# Patient Record
Sex: MALE | Race: WHITE
[De-identification: names, ages, dates, MRNs, and addresses within clinical notes are randomized per-mention and may not be internally consistent; named-entity substitution may affect disease eponyms.]

---

## 2017-08-01 ENCOUNTER — HOSPITAL ENCOUNTER (EMERGENCY)
Dept: HOSPITAL 80 - FED | Age: 32
Discharge: LEFT BEFORE DISCHARGE | End: 2017-08-01
Payer: MEDICAID

## 2017-08-01 DIAGNOSIS — Z53.21: Primary | ICD-10-CM

## 2017-08-02 ENCOUNTER — HOSPITAL ENCOUNTER (EMERGENCY)
Dept: HOSPITAL 80 - FED | Age: 32
Discharge: HOME | End: 2017-08-02
Payer: MEDICAID

## 2017-08-02 VITALS
RESPIRATION RATE: 18 BRPM | TEMPERATURE: 97.9 F | OXYGEN SATURATION: 98 % | HEART RATE: 86 BPM | DIASTOLIC BLOOD PRESSURE: 89 MMHG | SYSTOLIC BLOOD PRESSURE: 125 MMHG

## 2017-08-02 DIAGNOSIS — F31.9: Primary | ICD-10-CM

## 2017-08-02 DIAGNOSIS — F17.200: ICD-10-CM

## 2017-08-02 NOTE — EDPHY
H & P


Stated Complaint: requests concerta for few days until able to see pcp


Source: Patient


Exam Limitations: No limitations





- Personal History


Current Tetanus/Diphtheria Vaccine: Unsure


Current Tetanus Diphtheria and Acellular Pertussis (TDAP): Unsure





- Medical/Surgical History


Hx Asthma: No


Hx Chronic Respiratory Disease: No


Hx Diabetes: No


Hx Cardiac Disease: No


Hx Renal Disease: No


Hx Cirrhosis: No


Hx Alcoholism: No


Hx HIV/AIDS: No


Hx Splenectomy or Spleen Trauma: No


Other PMH: psh: denies.  PMH: bipolar;





- Social History


Smoking Status: Current some day smoker


Time Seen by Provider: 08/02/17 04:57


HPI/ROS: 





HPI


The patient presents with request for refill of his Concerta.  He is currently 

being prescribed this by his treating psychiatrist, Dr. Francisco Farrell.  His 

psychiatrist is discontinuing the medication and the patient only has 7 pills 

left.  He states that his psychiatrist would like him to be off of this 

medication because he feels that it may be worsening the manic episodes of his 

bipolar disorder.  The patient feels that the medication is actually quite 

helpful and he is concerned that if he goes off of it he will not be 

functioning at the same level he is now.  He does have an appointment with his 

primary care doctor on August 10th and he is hopeful that his primary care 

doctor will prescribe this medication, however, he is feeling quite stressed 

out currently.  He is here requesting mental health evaluation.  He says he 

would like to come up with a plan for when he runs out of his Concerta.  He 

denies any SI or HI.





REVIEW OF SYSTEMS


Constitutional:  No fever, no chills.


Eyes:  No discharge.


ENT:  No sore throat.


Cardiovascular:  No chest pain, no palpitations.


Respiratory:  No cough, no shortness of breath.


Gastrointestinal:  No abdominal pain, no vomiting.


Genitourinary:  No hematuria.


Musculoskeletal:  No back pain.


Skin:  No rashes.


Neurological:  No headache.





PMHx:  Bipolar disorder verses attention deficit hyperactivity disorder








PHYSICAL


General Appearance: Alert, no distress


Eyes: Pupils equal and round no pallor or injection


ENT, Mouth: Mucous membranes moist


Respiratory:  Breathing comfortably


Neurological:  A&O, moves all extremities


Skin:  Warm and dry, no rashes


Musculoskeletal: Neck is supple non tender 


Extremities:  symmetrical, full range of motion 


Psychiatric:  Patient is oriented X 3, there is no agitation 


 (Riguzzi,Bella)


Constitutional: 


 Initial Vital Signs











Temperature (C)  36.6 C   08/02/17 04:31


 


Heart Rate  86   08/02/17 04:31


 


Respiratory Rate  18   08/02/17 04:31


 


Blood Pressure  125/89 H  08/02/17 04:31


 


O2 Sat (%)  98   08/02/17 04:31








 











O2 Delivery Mode               Room Air














Allergies/Adverse Reactions: 


 





No Known Allergies Allergy (Unverified 12/28/15 09:03)


 








Home Medications: 














 Medication  Instructions  Recorded


 


Concerta  12/28/15


 


Lithium Carbonate  12/28/15


 


Seroquel  08/02/17














Medical Decision Making


Differential Diagnosis: 





This is a 32-year-old male with bipolar disorder, possibly attention deficit 

hyperactivity disorder, currently followed by a psychiatrist whom is 

discontinuing his prescription for Concerta.  Patient has 7 tabs left and is 

concerned about what will happen when he runs out.  He believes he is being 

proactive by coming to the emergency room to come up with a plan and would like 

to see the mental health worker for assistance with this.





He has no SI or HI currently and does not meet criteria for mental health hold.





Differential diagnosis includes attention deficit hyperactivity disorder, 

bipolar disorder with hypomania prescription drug abuse.





The patient is insistent on seeing the mental health worker, I have requested 

that they visit him, though I do not think he needs a full evaluation as he 

does not meet criteria for a hold. (Bella Holloway)


Other Provider: 





715: Salo from [facility] discussed situation with the patient and recommended he go 

directly to the emergency psychiatric center for further evaluation, as he does 

not appear to meet criteria for 72 hour mental health hold at this time and 

does not appear to have an acute medical or surgical emergency.  Patient and 

his mother are agreeable. (Ricardo Scales)





Departure





- Departure


Disposition: Home, Routine, Self-Care


Clinical Impression: 


Bipolar disorder


Qualifiers:


 Active/Remission status: remission status unspecified Qualified Code(s): F31.9 

- Bipolar disorder, unspecified





Condition: Good


Instructions:  Bipolar Disorder (ED)


Additional Instructions: 


Go directly to the Emergency mental health center as discussed with you by 

Salo from [facility].


He has been medically cleared for further psychiatric evaluation by them.


Referrals: 


Meri Murphy MD [Primary Care Provider] - As per Instructions


[facility],. [Clinic] - As per Instructions

## 2018-05-16 ENCOUNTER — HOSPITAL ENCOUNTER (EMERGENCY)
Dept: HOSPITAL 80 - FED | Age: 33
LOS: 2 days | Discharge: PSYCHIATRIC OR SUBSTANCE USE FACILITY | End: 2018-05-18
Payer: MEDICAID

## 2018-05-16 DIAGNOSIS — R45.850: Primary | ICD-10-CM

## 2018-05-16 PROCEDURE — G0480 DRUG TEST DEF 1-7 CLASSES: HCPCS

## 2018-05-17 LAB — PLATELET # BLD: 200 10^3/UL (ref 150–400)

## 2018-05-17 NOTE — EDPHY
H & P


Stated Complaint: M-1 HOLD BIB [facility]





- Personal History


Current Tetanus/Diphtheria Vaccine: Yes


Current Tetanus Diphtheria and Acellular Pertussis (TDAP): Yes





- Medical/Surgical History


Hx Asthma: No


Hx Chronic Respiratory Disease: No


Hx Diabetes: No


Hx Cardiac Disease: No


Hx Renal Disease: No


Hx Cirrhosis: No


Hx Alcoholism: No


Hx HIV/AIDS: No


Hx Splenectomy or Spleen Trauma: No


Other PMH: psh: denies.  PMH: bipolar;





- Social History


Smoking Status: Current some day smoker


Time Seen by Provider: 05/16/18 23:35


HPI/ROS: 





Chief Complaint:  Homicidal ideation





HPI:  32-year-old male was brought in by [place] police with concerns about 

homicidal ideation.  Patient states that he has been diagnosed with bipolar 

disorder in the past but feels that this was a misdiagnosis.  Patient states 

that he is very upset with the course of treatment he has had in the past and 

was calling medical board today to report his primary care provider for the 

misdiagnosis.  During the course that conversation the patient had mentioned 

that he now understands why people who conduct mass killing sees often fall 

through the cracks because of missed diagnosis such as these.  The FBI went to 

his house yesterday to investigate.  Today the patient has been calling 

speaking with the crisis center multiple times for several hours.  During his 

course those conversations patient continues to add mention mass shooting so.  

Police department was called.  They did search his apartment but did not find 

any weapons.  Patient denies owning any weapons.  They were unable to search is 

high car.  Patient currently denies being homicidal.  States he does not want 

to conduct a mass killing but he is very concerned in agitated about his 

perceived missed diagnosis.  Patient will put on M1 hold by police and brought 

in for further evaluation.





ROS:  10 point Review of Systems is negative except as noted in the HPI.





PMH:  Attention deficit hyperactivity disorder, questionable bipolar disorder





Social History: No smoking, no alcohol,  no recreational drug use





Family History: non-contributory





Physical Exam:


Gen: Awake, Alert, No Distress


HEENT:  


     Nose: no rhinorrhea


     Eyes: PERRLA, EOMI


     Mouth: Moist mucosa 


Neck: Supple, no JVD


Chest: nontender, lungs clear to auscultation


Heart: S1, S2 normal, no murmur


Abd: Soft, non-tender, no guarding


Back: no CVA tenderness, no midline tenderness 


Ext: no edema, non-tender


Skin: no rash


Neuro: CN II-XII intact, Sensation grossly intact, Strength 5/5 in bilateral 

upper and lower extremities


 (Herminio Hammond)


Constitutional: 





 Initial Vital Signs











Temperature (C)  36.7 C   05/16/18 23:30


 


Heart Rate  88   05/16/18 23:30


 


Respiratory Rate  18   05/16/18 23:30


 


Blood Pressure  163/107 H  05/16/18 23:30


 


O2 Sat (%)  99   05/16/18 23:30








 











O2 Delivery Mode               Room Air














Allergies/Adverse Reactions: 


 





No Known Allergies Allergy (Unverified 05/16/18 23:44)


 








Home Medications: 














 Medication  Instructions  Recorded


 


Methylphenidate HCl [Concerta] 54 mg PO DAILY 12/28/15


 


QUEtiapine FUMARATE [Seroquel 100 100 mg PO HS 08/02/17





mg (*)]  


 


methYLPHENIDATE HCL [Ritalin 10mg 10 mg PO DAILY14 05/16/18





(*)]  














Medical Decision Making


ED Course/Re-evaluation: 





I took over care of this patient at 8:00 p.m..  This patient is on an M1 hold 

for homicidal ideation.  He made references to caring out a mash shooting.  He 

has a history of bipolar disorder as well.  Behavioral Health is currently 

searching for placement.





11:00 p.m., behavioral Health continues to search for placement.  Care turned 

back over to Dr. Herminio Hammond. (McCollester,Laughlin B)





Patient is on a mental health hold for danger to others given references to 

mass shooting.  He is awaiting mental health evaluation.  Mg of Ativan to help 

him sleep tonight.  Patient has been calm and cooperative in the emergency 

department.  He is currently denying plans to harm others.  He states that his 

calls were an attempt to warm others of the danger of the mental health 

diagnosis process.





0640  patient signed over to Dr. Scales pending mental health evaluation.





5/17/18  2300  care resumed by me from Dr. McCollester pending placement.





5/18/18  0700  patient signed out to Dr. Ballesteros pending placement.  I have had 

no issues with this patient during my care overnight. (Herminio Hammond)





Patient has been evaluated by mental health.  He remained stable through my 

shift.  He has been accepted at [facility]. (Bubba Ballesteros)


Differential Diagnosis: 





I considered intoxications, withdrawals, bipolar illness (Bubba Ballesteros)


Other Provider: 





Care assumed at 6:40 a.m., patient is on a mental health hold for danger to 

others, psychiatric evaluation pending.


858:  Patient's medications confirmed by pharmacy including methylphenidate 

Concerta 54 mg extended release q.a.m..


Discussed with Mary in pharmacy who asked me to fax down a paper order and she 

will insure he gets the medication even though it is not on our formulary.





1307: signed to [facility] with inpatient psych placement pending. (Ricardo Scales)





- Data Points


Laboratory Results: 





 Laboratory Results





 05/17/18 00:00 





 05/17/18 00:00 








Medications Given: 





 





Methylphenidate HCl (Ritalin)  10 mg PO DAILY [facility]


   Stop: 11/14/18 08:59


   Last Admin: 05/17/18 19:00 Dose:  10 mg


Miscellaneous Medication (Non-Formulary)  0 ea PO DAILY [facility]


   Stop: 11/14/18 08:59


   Last Admin: 05/18/18 05:36 Dose:  54 mg





Discontinued Medications





Lorazepam (Ativan)  1 mg PO EDNOW ONE


   Stop: 05/17/18 00:49


   Last Admin: 05/17/18 00:50 Dose:  1 mg


Lorazepam (Ativan)  1 mg PO EDNOW ONE


   Stop: 05/17/18 21:51


   Last Admin: 05/17/18 21:52 Dose:  1 mg


Quetiapine Fumarate (Seroquel)  100 mg PO EDNOW ONE


   Stop: 05/17/18 21:46


   Last Admin: 05/17/18 21:49 Dose:  100 mg








Departure





- Departure


Disposition: Other Psych, Not [place]


Clinical Impression: 


 Homicidal ideation





Condition: Fair


Referrals: 


Lyric Lynch DO [Primary Care Provider] - As per Instructions

## 2018-05-18 VITALS — DIASTOLIC BLOOD PRESSURE: 74 MMHG | SYSTOLIC BLOOD PRESSURE: 138 MMHG

## 2018-07-21 ENCOUNTER — HOSPITAL ENCOUNTER (INPATIENT)
Dept: HOSPITAL 80 - FED | Age: 33
LOS: 5 days | Discharge: HOME | DRG: 885 | End: 2018-07-26
Attending: NURSE PRACTITIONER | Admitting: SPECIALIST
Payer: COMMERCIAL

## 2018-07-21 DIAGNOSIS — S93.492A: ICD-10-CM

## 2018-07-21 DIAGNOSIS — F17.200: ICD-10-CM
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
## 2018-07-21 DIAGNOSIS — N17.9: ICD-10-CM
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
## 2018-07-21 DIAGNOSIS — R74.0: ICD-10-CM

## 2018-07-21 DIAGNOSIS — F31.81: Primary | ICD-10-CM

## 2018-07-21 DIAGNOSIS — F60.9: ICD-10-CM

## 2018-07-21 LAB — PLATELET # BLD: 244 10^3/UL (ref 150–400)

## 2018-07-21 PROCEDURE — G0480 DRUG TEST DEF 1-7 CLASSES: HCPCS

## 2018-07-21 NOTE — EDPHY
H & P





- Medical/Surgical History


Hx Asthma: No


Hx Chronic Respiratory Disease: No


Hx Diabetes: No


Hx Cardiac Disease: No


Hx Renal Disease: No


Hx Cirrhosis: No


Hx Alcoholism: No


Hx HIV/AIDS: No


Hx Splenectomy or Spleen Trauma: No


Other PMH: psh: denies.  PMH: bipolar;





- Social History


Smoking Status: Current some day smoker


Time Seen by Provider: 07/21/18 20:59


Constitutional: 


 Initial Vital Signs











Temperature (C)  37.1 C   07/21/18 20:50


 


Heart Rate  105 H  07/21/18 20:50


 


Respiratory Rate  16   07/21/18 20:50


 


Blood Pressure  170/105 H  07/21/18 20:50


 


O2 Sat (%)  98   07/21/18 20:50








 











O2 Delivery Mode               Room Air














Allergies/Adverse Reactions: 


 





No Known Allergies Allergy (Verified 07/21/18 21:07)


 








Home Medications: 














 Medication  Instructions  Recorded


 


Lithium Carbonate [Lithium 450 mg PO DAILY@0900 07/22/18





Carbonate Tab 300 mg (*)]  


 


Lithium Carbonate [Lithium 675 mg PO DAILY@2100 07/22/18





Carbonate Tab 300 mg (*)]  


 


Methylphenidate HCl [Concerta] 54 mg PO DAILY 07/22/18


 


Propranolol HCl [Inderal 10mg (*)] 10 mg PO BID 07/22/18














Medical Decision Making


ED Course/Re-evaluation: 





CHIEF COMPLAINT:  M1, ankle injury, delusions





HISTORY OF PRESENT ILLNESS:  The patient is a 32 y/o male arriving via [facility] on 

an M1 hold with a history of bipolar disorder complaining of left ankle pain 

after he injured it while running from people he thought were following him 

tonight. He says, "I've been under an FBI investigation" and "as I was running 

I was placed in a situation where I was startled and I rolled my ankle and I 

wasn't sure if I was supposed to continue my run or stop and at some point I 

stopped and called the officer for help." He says, "I was misdiagnosed with 

bipolar for 10 years;" "I've moved past it but at the moment I was upset and 

decided to make a complaint to the [place] medical board." He says, "my sister ran 

away when he was 15 and I've had trouble dealing with those emotions and that 

was why I was labeled bipolar;" "I'm trying to go to law school. I'm trying to 

move on with my life." He reports he had a recent psychiatric evaluation at 

[facility] and "they think I'm doing fine." Denies 

intoxicants, suicidal ideation, homicidal ideation. 





REVIEW OF SYSTEMS:  





A 10 point review of systems was performed and is negative with the exception 

of the elements mentioned in the history of present illness.





PHYSICAL EXAM:  





HR, BP, O2 Sat, RR.  Temp noted


General Appearance:  Alert, well hydrated, appropriate, and non-toxic appearing.


Head:  Atraumatic without scalp tenderness or obvious injury


Eyes:  Pupils equal, round, reactive to light and accommodation, EOMI, no trauma

, no injection.


Ears:  Clear bilaterally, no perforation, normal landmarks


Nose:  Atraumatic, no rhinorrhea, clear.


Throat:  There is no erythema or exudates, no lesions, normal tonsils, mucus 

membranes moist.


Neck:  Supple, nontender, no lymphadenopathy.


Respiratory:  No retractions, no distress, no wheezes, and no accessory muscle 

use.  Lungs are clear to auscultation bilaterally.


Cardiovascular:  Regular rate and rhythm, no murmurs, rubs, or gallops. Good 

capillary refill all extremities.


Gastrointestinal:  Abdomen is soft, nontender, non-distended, no masses, no 

rebound, no guarding, no peritoneal signs.


Musculoskeletal:  Normal active ROM of all extremities, mild swelling over left 

lateral ankle, otherwise atraumatic.


Neurological:  Alert, appropriate, and interactive. Nonfocal. 


Skin:  No rashes, good turgor, no nodules on palpation.





Past medical history: ADHD, bipolar?


Past surgical history: Noncontributory


Family history: Noncontributory


Social history: Lives in [place]. Not employed. Denies recent alcohol or drugs.





DIAGNOSTICS/PROCEDURES/CRITICAL CARE TIME:  





Left ankle x-ray: negative





DIFFERENTIAL DIAGNOSIS:   The differential diagnosis for the patient's ankle 

injury included but was not limited to fracture, ligamentous injury, contusion, 

muscular strain.





MEDICAL DECISION MAKING:  





Patient is in no acute distress and is hemodynamically stable.  He has a clear 

lack of insight and paranoia currently. We are awaiting psychiatric team's 

evaluation.  Patient has known history of psychiatric disorders and is here for 

evaluation. Ankle x-ray is negative for fracture.  (Alfred Holm)





No acute events overnight patient here on M1 hold bipolar disorder.  Pending 

psychiatric evaluation.  Paranoid about the FBI chasing him.  Signed over to 

Dr. cason at 7 am shift-change.  (Jhonathan Tsai)





7:00 a.m.-I assumed care of this patient at shift change.  He is on an M1 hold 

for acute psychosis, mental health evaluation pending.


1315: accepted to [facility], Dr. King (Alejandra Cason)





- Data Points


Laboratory Results: 


 Laboratory Results





 07/21/18 21:03 





 07/21/18 21:03 





 











  07/22/18





  07:30


 


Lithium  0.5 mEq/L L mEq/L





   (0.6-1.2) 











Medications Given: 


 








Discontinued Medications





Clonazepam (Klonopin)  0.5 mg PO EDNOW ONE


   Stop: 07/22/18 11:37


   Last Admin: 07/22/18 11:49 Dose:  0.5 mg


Ibuprofen (Motrin)  600 mg PO EDNOW ONE


   Stop: 07/21/18 21:46


   Last Admin: 07/21/18 21:45 Dose:  600 mg


Ibuprofen (Motrin)  600 mg PO EDNOW ONE


   Stop: 07/22/18 07:30


   Last Admin: 07/22/18 08:08 Dose:  600 mg


Lithium Carbonate (Lithium Carbonate)  450 mg PO EDNOW ONE


   Stop: 07/22/18 10:43


   Last Admin: 07/22/18 11:02 Dose:  450 mg








Departure





- Departure


Disposition: [facility] IP


Clinical Impression: 


 Acute psychosis





Condition: Fair


Referrals: 


Lyric Lynch DO [Primary Care Provider] - As per Instructions


Report Scribed for: Alfred Holm


Report Scribed by: Elvira Bruner


Date of Report: 07/21/18


Time of Report: 21:20

## 2018-07-21 NOTE — ASMTLCPROG
Notes

 

Note:                         

Notes:

Spoke with [facility]. PT was evaluated on 7/19/18 and they reported he was stable and not psychotic. Per 

[facility] request, [facility] faxed over an eval from the last psychiatric hosptialization in May. 

 

Date Signed:  07/21/2018 11:44 PM

Electronically Signed By:Jamal Casillas

## 2018-07-22 RX ADMIN — LITHIUM CARBONATE SCH MG: 450 TABLET, EXTENDED RELEASE ORAL at 20:06

## 2018-07-22 RX ADMIN — PROPRANOLOL HYDROCHLORIDE SCH MG: 10 TABLET ORAL at 20:05

## 2018-07-22 NOTE — ASMTBHMTP
Master Treatment Plan

 

Master Treatment Plan         Answers:  Mood Instability with                 

for:                                    Psychosis                             

Date:                         07/22/2018

Diagnosis on Admission:       Bipolar 1 Disorder

Expected length of stay:      3-5 Days

Reason for admission:         

Notes:

Per [facility] evaluation - 33 year old [place] American male was placed on a hold by the [employer] 

after he dialed 911 to discuss the fact that he feels he is under investigation by the FBI because 

he wrote a letter to the [place] [employer] to dispute his diagnosis of Bipolar Disorder. 

Patient's stated              

presenting problems:          

Notes:

Patient has been struggling with a misdiagnosis and when he challenge his doctor there was a 

misunderstanding, which lead to pt. being investigated for possible mass shooting. Pt. stated this 

has been going on since May. 

Patient's goals for           

treatment:                    

Notes:

How to re-incorporate myself into society without being triggered. 

Patient's strengths:          

Notes:

Athletic, smart, compassionate, believe in God, have reva, and fall back on my reva. 

Identify supports outside     

of hospital:                  

Notes:

Mom and dad and friends, Michel and January

Discharge criteria:           

Notes:

Patient will demonstrate more stable mood by discharge. 

Initial disposition           

plan/considerations:          

Notes:

Return home, continue studying for law school, and take law school test in November. 

Master Treatment Plan Required Signatures

 

Psychiatrist signature:       Answers:  Psychiatrist: ______________________________

RN on-shift signature:        Answers:  RN: ____________________________________

Patient signature:            Answers:  Patient: ____________________________________

 

Date Signed:  07/22/2018 03:36 PM

Electronically Signed By:Kim Brantley

## 2018-07-22 NOTE — ASMTTLCEVL
TLC Evaluation - Basic Information

 

Evaluation Start Date and     07/15/2018 07:45 AM

Time                          

Hospital Status               Answers:  M1 Hold                               

72-hr M1 Hold Start Date      07/21/2018 08:15 AM

and Time                      

Patient statement             

Notes:

"I am under an FBI investigation, As I was running I was placed in a situation where I was startled 


and I rolled my ankle and I wasn't sure if I was supposed to continue my run or stop and at some 

point I stopped and called the officer for help."

Narrative                     

Notes:

This 34 y/o [place] American, single, bisexual male was placed on a hold by the [employer] 

after he dialed 911 to discuss the fact that he feels he is under investigation by the FBI because 

he wrote a letter to the [place] [employer] to dispute his diagnosis of bipolar 

disorder. Pt continues to maintain this delusion, though he initially told me he was here because 

he injured his ankle running away from the FBI. Pt denies suicidal, homicidal, parasuicidal 

ideation, urges or thoughts. Pt denies hallucinations but is delusional and somewhat paranoid.  

Diagnosis History             

Notes:

Pt reports that he first sought psychiatric treatment during his freshman year at  for stress. At 


that time he was diagnosed with bi-polar disorder. In addition he reports diagnoses of ADHD and 

PTSD 

Prior suicide attempts        

Notes:

Pt denies

Prior hospitalizations        

Notes:

Pt has been evaluated in the ED (May 2018) and has had treatment at an [facility] but has never had an 

in-pt psych hospitalization.

Treatment Responses           

Notes:

Pt reports that his medications help, particularly Concerta

History of violence           

Notes:

Pt denies but when last in the ED (may2018) he was wondering what it would like to be the shoolter 

in a mass shooting but denied he had any wish to engage in such behavior

Therapist:                    P

Psychiatrist:                 P

Medications                   

(name, dosage, route, freq    

uency)                        

Notes:

Seroquel 100 mg HS; Lithium 1125 mg daily;Propranolol 20 mg daily; Concerta - dose unknown per pt

Allergies/Reaction            

Notes:

Pt denies

Sleep                         

Notes:

Normal

Appetite                      

Notes:

Normal

Medical/Surgical history      

Notes:

Pt denies

Substance use history         

(frequency, intensity, his    

tory, duration)               

Notes:

Pt. denies

Family composition            

Notes:

Parents live in [place] and are primarily [place] speaking; 1 older sister who is estranged from 

family; 1 younger sister

Need for family               Answers:  No                                    

participation in                                                              

patient's care                                                                

Family                        

psychiatric/substance         

abuse history                 

Notes:

Pt denies

Developmental history         

Notes:

Pt was born in [place] but his family left [place] to achieve a "better life". Family moved to [place] 

when he was 15. At that time his older sister left home due to fights with parents and he believes 

she ended up a heroin addict and has had no contact with family since. He feels traumatized by this 


and says it affected his sexuality (She apparently was acting out and was promiscuous). His younger 


sister was in a serious car accident in 2012. He feels overwhelmed by feelings of abandonment and 

pain. He was an excellent student. He graduated from  with a degree in Engineering but hasn't 

been able to work since 2012.

Abuse concerns                Answers:  None                                  

Marital status/children       

Notes:

Single; no children

Living situation              

Notes:

Pt has his own apartment in [place]

Sexual                        

history/orientation           

Notes:

Pt believes that he may be bi-sexual; Has been prinarily bravo

Peer support/family           

strengths                     

Notes:

Pt reports that he loves his parents - does not have friends - has been isolated from others

Education level/history       

Notes:

Pt reports a degree in Engineering from 

Work history                  

Notes:

Hasn't worked since 2012

                      

Notes:

NA

Legal                         

Notes:

Pt denies

Religion/Spiritual           

Notes:

Pt is a practicing Mu-ism

Leisure                       

Notes:

Going to the gym

Collateral                    

Notes:

Records from a prior [facility] evaluation

TLC Evaluation - Mental Status Exam

 

Appearance:                   Answers:  Clean                                 

                                        Neat                                  

Eye Contact:                  Answers:  Good/Direct                           

Mood:                         Answers:  Sad                                   

Affect:                       Answers:  Congruent w/ Mood                     

                                        Sad                                   

Behavior:                     Answers:  Appropriate                           

                                        Cooperative                           

Speech:                       Answers:  Illogical                             

                                        Clear                                 

                                        Rambling                              

                                        Soft                                  

Thought Process:              Answers:  Oriented                              

                                        Alert                                 

                                        Paranoid                              

Insight:                      Answers:  Poor                                  

Depression                    Answers:  Sad Mood                              

Signs/Symptoms:                                                               

Hallucinations:               Answers:  None                                  

Delusions:                    Answers:  Paranoid Ideation                     

                                        Persecution                           

Pt reported to have           Answers:  No                                    

suicidal/self-injuring                                                        

ideation/behavior?                                                            

Pt reported to be making      Answers:  No                                    

suicidal/self-injuring                                                        

threats?                                                                      

Pt reported to have           Answers:  No                                    

aggression/assault                                                            

ideation/behavior?                                                            

Pt reported to be making      Answers:  No                                    

aggression/assault                                                            

threats?                                                                      

Pt exhibits inability to      Answers:  Yes                                   

care for self/grave                                                           

disability?                                                                   

Ideation/behavior is          Answers:  Yes                                   

chronic?                                                                      

Patient has a specific        Answers:  No                                    

plan?                                                                         

Pt has access to means to     Answers:  No                                    

execute the plan?                                                             

Ideation involves             Answers:  No                                    

serious/lethal intent?                                                        

Ideation has                  Answers:  No                                    

delusional/hallucinatory                                                      

content?                                                                      

History of                    Answers:  No                                    

suicidal/self-injuring                                                        

ideation, behavior, or                                                        

threats?                                                                      

History of                    Answers:  No                                    

aggressive/assaultive                                                         

ideation, behavior, or                                                        

threats?                                                                      

History of serious            Answers:  No                                    

physical harm to                                                              

self/others while in                                                          

treatment setting?                                                            

TLC Evaluation - Suicide/Homicide Risk

 

Suicide Risk Factors:         Answers:  Bipolar Disorder                      

                                        Inadequate Social Support             

                                        Lack of Social Support                

                                        Lack/Loss of Employment               

                                        Psychotic Disorder                    

                                        Single                                

Homicide/violence risk        Answers:  None                                  

factors:                                                                      

Current Suicidal              Answers:  No                                    

Ideation?                                                                     

Current Suicidal Ideation     Answers:  No                                    

in the Past 48 Hours?                                                         

Current Suicidal Ideation     Answers:  No                                    

in the Past Month?                                                            

Suicide Internal              Answers:  Religion Beliefs                     

Protective Factors:                                                           

Suicide External              Answers:  Other                         Notes:  family

Protective Factors:                                                           

Ranking of patient's          Answers:  Low                                   

suicidal risk:                                                                

Ranking of patient's          Answers:  Low                                   

homicidal risk:                                                               

TLC Evaluation - Wrap-up

 

AXIS I Diagnosis (include     

DSM-V and ICD-10              

codes), must also be          

entered in                    

[employer], which is the        

source of truth.              

Notes:

296.44 (F31.2)  Bi-Polar 1 Disorder with psychotic features

Evaluation End Date and       07/22/2018 09:15 AM

Time (HH:STEVEN):                 

 

Date Signed:  07/22/2018 09:17 AM

Electronically Signed By:Agnieszka Mathew

## 2018-07-23 RX ADMIN — LITHIUM CARBONATE SCH MG: 450 TABLET, EXTENDED RELEASE ORAL at 21:10

## 2018-07-23 RX ADMIN — PROPRANOLOL HYDROCHLORIDE SCH MG: 10 TABLET ORAL at 08:44

## 2018-07-23 RX ADMIN — PROPRANOLOL HYDROCHLORIDE SCH MG: 10 TABLET ORAL at 21:11

## 2018-07-23 RX ADMIN — LITHIUM CARBONATE SCH MG: 450 TABLET, EXTENDED RELEASE ORAL at 08:43

## 2018-07-23 NOTE — BAPA
[f 
rep st]



                                                  ADMISSION PSYCHIATRIC 
ASSESSMENT





DATE OF SERVICE:  07/23/2018



CHIEF COMPLAINT:  "I am under an FBI investigation."



HISTORY OF PRESENT ILLNESS:  From the ED note dated 07/21/2018, the patient 
arrived to the ED by [employer] on an M1 hold with a history of 
bipolar disorder, complaining of ankle pain after he injured it while running 
from people he thought were following him tonight.  The patient reported he was 
misdiagnosed with bipolar disorder 10 years ago.  The patient reported he has 
moved to past being misdiagnosed with bipolar disorder, however, he did make a 
complaint to the [place] [employer].  The patient reported his sister ran 
away when he was 15 and patient stated he has been having trouble with emotions 
and that he has been labeled with bipolar disorder.  The patient denied 
suicidal and homicidal ideation in the ED.  



From the [facility] evaluation dated 07/21/2018, the patient was placed on a hold. The 
hold start date was 07/21/2018 at 8:15 a.m.  The patient reported to [facility] 
evaluator, "I am under an FBI investigation.  As I was running, I was place in 
a situation where I was startled and I rolled my ankle and I wasn't sure if I 
was supposed to continue my run or stop at some point, and I stopped and called 
the officer for help."  The patient was placed on an M1 hold by [employer] 
[employer] after he dialed 911 to discuss the fact that he is under 
investigation by FBI because he wrote a letter to the [place] [employer] 
[employer] to dispute his diagnosis of bipolar disorder.  



The patient was admitted involuntarily and is on an M1 hold due to being 
gravely disabled and is hospitalized for safety, crisis stabilization and 
medication evaluation.  The patient describes circumstances that led to current 
hospitalization as feeling like people are following him.  He was running and 
while running, he fell down, twisted his ankle.  He then called the  and 
reported that he twisted his ankle due to being followed by people.  These 
people he thinks are with the FBI and the FBI is trying to get his personality 
to see if he has a potential shooter.  The patient states that this all started 
when he wrote a letter to the [place] [employer] complaining about his 
old doctor and a misdiagnosis of bipolar disorder.  The patient reports that 
the FBI came to his door May of this year after he wrote the letter to the 
[place] [employer].  The patient describes that in the letter he stated 
that when somebody is misdiagnosed and they get on the wrong medications that 
is when mass shootings occur.  The patient stated in the letter that someone 
can do mass shootings if not medicated correctly.  The patient reported that 
after writing this letter he was visited by the FBI for an evaluation and the 
next day.  The patient reports that he was again evaluated by the [employer] and during the evaluation with the [employer], 
he became very agitated and this led to him being hospitalized at [facility], then to 
[facility], and then was admitted to [facility] for 3 days.  The patient 
reports a total of 1 week through this situation.  The patient agrees that the 
reason that he was visited by the FBI in and the [employer] was 
due to him making mention of mass shootings in the letter that he wrote to the 
[place] [employer].  The patient reports that he did not make any 
specific threats.  He just was making a hypothetical statement in the letter 
regarding what happens when people are misdiagnosed and not medicated correctly 
that this could turn into a mass shooting-type situation.  The patient agrees 
that this statement was inappropriate and states that he was just upset and was 
trying to get the [facility]'s attention because he was upset 
about his previous doctor diagnosing him with bipolar disorder and then 
prescribing him medications for bipolar disorder.  The patient denies any 
history of chinedu symptoms and states he is on lithium, not for bipolar disorder
, but because it helps him with his OCD, helps him not get too focused on 
something that is not healthy.  The patient reports that lithium helps him stay 
calm.  The patient states that he has been on lithium for years at the current 
dose.  The patient reports current mental illness as ADHD and patient denies 
any history of symptoms of bipolar disorder when questioned with specific 
symptoms of bipolar disorder by this NP.  The patient states current alcohol and
/or substance abuse that contributed to this hospitalization as none.  The 
patient describes current psychiatric symptoms as mild anxiety.  The patient 
describes trauma history as his 12-year-old sister ran away about 17 years ago 
and he has not heard from her since.  The patient reports he is now triggered 
around women and he reports this has made it difficult for him to date females.
  The patient describes abuse history as none.  The patient describes PTSD 
symptoms from trauma of his sister running away as re-experiencing in memories, 
thoughts, avoidance, hypervigilance, and feeling triggered by females because 
this reminds him of his sister.  The patient denies other psychiatric symptoms 
including symptoms of depression, chinedu, ADHD, PTSD, psychosis, and any other 
symptom of psychiatric disorder.  The patient does report a history of ADHD and 
OCD, however, the patient does not describe any of these symptoms at this time.
  The patient describes current psychiatric symptoms are impacting managing his 
day-to-day life described as attending to household responsibilities without 
difficulty.  The patient reports he recently transitioned from living with his 
parents to living alone.  The patient reports he has been living alone for 
about 1 week.  The patient reports he has not worked and has been unemployed 
since 2012.  The patient reports the reason he has been unemployed since 2012 
was because he was misdiagnosed with bipolar disorder.  The patient describes 
not socializing too much, however, states he does socialize within physical 
activity context such as at the gym and other sporting and physical activities.
  The patient reports he does get along with his family well.  The patient 
states that he has not been in school due to being misdiagnosed and not being 
medicated correctly.  The patient reports that due to him being misdiagnosed 
and not medicated correctly, he has not been able to perform well on the law 
school entrance exam and this has prevented him from being able to get into law 
school.  The patient describes hobbies as working out, spinning class, running 
growing reading and anything to do with engineering.  The patient reports he is 
currently not satisfied with his life due to currently being investigated by 
the FBI and this has been going on since May of this year.  The patient denies 
suicidal ideation and reports protective factors as his future goal to go to 
law school and as his family.  The patient reports his main support network as 
his parents.  The patient denies current homicidal ideation and patient reports 
he has never had any homicidal ideation.  The patient denies current self-
injurious ideation.  The patient reports his medications are currently managed 
by Lyric Lynch MD, his primary care provider, on an outpatient basis at the 
[facility].  At the [facility], patient sees Dr. Whitmore and 
patient reports he is supposed to see Dr. Cao this Friday for an 
appointment at the [facility].  The patient reports he sees Allie Soria, therapist at [facility].



PAST PSYCHIATRIC HISTORY:  The patient describes the following psychiatric 
history:  The patient reports he has been diagnosed with bipolar disorder; 
however, he disagrees with this diagnosis and reports he has been misdiagnosed.
  The patient reports that he has been on numerous medications and states, "I 
have been on all of them."  The patient reports outpatient services for 
medication management and therapy at [facility].  The patient 
reports that he has been hospitalized on an inpatient Behavioral Health Unit 
once before and this was May of this year at [facility] for 3 days.  The 
patient reports no other history of inpatient psychiatric hospitalizations.  
The patient denies history of withdrawal from drugs or alcohol.  The patient 
denies history of suicidal ideation or suicide attempts.  The patient denies 
history of self-injurious behavior.



ALLERGIES:  No known drug allergies.



CURRENT MEDICATIONS:  Lithium carbonate  mg p.o. twice daily, Ativan 0.5 
mg to 1 mg p.o. q.4 hours p.r.n., propranolol 10 mg p.o. twice daily, Seroquel 
200 mg p.o. at bedtime.



PAST MEDICAL HISTORY:  The patient describes the following:  The patient denies 
neurological history including no history of organic brain disease, no history 
of traumatic brain injury, and no history of concussions.  The patient denies 
history of major illnesses and denies history of major hospitalizations.



SOCIAL HISTORY:  The patient describes the following social history:  The 
patient reports he was born in [place].  States his parents were __________at the 
time of his birth and parents remained  today.  The patient states he 
was raised the majority of his life in [place] by both parents.  The patient 
states he is currently living in [place], [place] alone, and his parents live 
in [place], [place].  Patient describes meeting all his developmental 
milestones and states he has no learning delays or difficulties.  The patient 
describes his sexual orientation as heterosexual, but he does have sex with 
men.  The patient describes he currently is not in a relationship, has never 
been  and has no children.  The patient states he is currently unemployed
, has been unemployed since 2012.  The patient reports that he carries 2 
engineering degrees, 1 in chemical engineering and 1 in applied mathematics.  
The patient denies history of  duty.  Patient describes Spiritism and 
spiritual practice as [place].  The patient denies current legal charges.



SUBSTANCE USE HISTORY:  Patient denies history of alcohol and substance use.



FAMILY/PSYCHIATRIC HISTORY:  The patient describes the following family 
psychiatric history:  The patient describes a family history of ADHD, does not 
provide specific details regarding family members.  The patient denies family 
history of suicide or suicide attempts.  The patient denies family history of 
substance use or alcohol abuse.



ADMISSION LAB STUDIES:  From 07/21/2018, CBC:  White blood cell elevated at 
13.03, MPV elevated at 12.0, absolute neutrophils elevated at 8.55, absolute 
lymphocytes elevated at 3.24, absolute monocytes elevated at 0.88.  All other 
CBC within normal limits.  Chemistry from 07/21/2018:  Within normal limits 
except for carbon dioxide low at 21, creatinine elevated at 1.4, and glucose 
elevated at 102.  All other within normal limits.  Liver function tests from 07/
22/2018:  Total bilirubin elevated at 2.8, conjugated bilirubin elevated at 0.7
, unconjugated bilirubin elevated at 2.1, AST elevated at 158.  Fasting lipid 
panel from 07/22/2018, elevated cholesterol at 204, elevated LDL cholesterol 
calculated at 130, and elevated nonHDL cholesterol at 144.  Hemoglobin A1c from 
07/22/2018, 5.0.  Estimated average glucose 97.  Toxicology negative for 
substances of abuse and ethyl alcohol.  Lithium level at a nontherapeutic range 
at 0.5.



MENTAL STATUS EXAM: The patient presents casually dressed and with good hygiene
, and looks stated age.  Patient is sitting, posture is upright, and position 
is relaxed.  Patient appears awake, alert, and responds appropriately and 
reasonably during interview.  Patient is engaged, relates well to interviewer, 
and emotional facial expression is appropriate to situation and changes 
appropriately with topic.  Patient is cooperative, makes comfortable eye contact
, and movements are voluntary, deliberate, coordinated, and smooth and even 
with no inappropriate movements.  Patient makes laryngeal sounds effortlessly 
and shares conversation appropriately; pace of conversation is appropriate, and 
stream of talking is fluent; articulation is clear and understandable; word 
choice is effortless and appropriate for education level; completes sentences, 
occasionally pausing to think; rate and volume are appropriate for interview 
and setting.  Patient reports mood as euthymic.  Patients affect is stable with 
full variable range, congruent with mood, and appropriate to speech and 
circumstances.  Patient has linear and logical thinking, with no loose 
associations, tangential thought, thought blocking, concrete thinking, or any 
other signs of formal thought disorder.  Patient denies suicidal and homicidal 
ideation, and denies hallucinations and delusions.  Patient appears to be a 
reliable historian with sound judgement and good insight into current 
condition.  Patient has no apparent dysfunction in recent or remote memory noted
, and no evidence of gross cognitive dysfunction noted at any point during the 
interview.



DIAGNOSIS:  Unspecified psychosis, rule out bipolar disorder, rule out 
personality disorder.



FORMULATION:  The patient is a 33-year-old male, single, unemployed, living in 
[place], [place], alone who presents to the hospital involuntarily due to 
being gravely disabled and is currently on an M1 hold.  The patient requires 
continued inpatient care because of recent crisis that led to this 
hospitalization.  The patient presents with problems of psychosis, particularly 
paranoia, that has been steadily increasing over the past several days.  The 
patient's life has been affected by these problems including being unemployed 
for 6 years, being paranoid that led up to this hospitalization.  The onset or 
exacerbation of symptoms is unknown at this time what the cause was.  The 
patient reports a past psychiatric history of being diagnosed with bipolar 
disorder; however, patient is not in agreement with this diagnosis and patient 
does not report any history of current or past chinedu symptoms at this time.  
Based on the patient's history and current presentation, his diagnoses are 
unspecified psychosis, rule out bipolar disorder, rule out personality 
disorder.  The patient is at a moderate safety risk due to recent crisis that 
led to this hospitalization and could benefit from ongoing observation for 
safety and medication evaluation.  Protective factors while hospitalized 
include ongoing safety checks, active involvement in treatment, and support 
from our treatment team.  The patient could benefit from inpatient 
hospitalization for safety, crisis stabilization and medication evaluation.



PLAN:  

(1)  Psychotropic medications:  After reviewing options, risks and benefits, 
the patient agrees to continue current medications.  

(2)  Labs:  No additional labs at this time.

(3) Therapy: milieu and group 

(4) Further investigation including gathering information from patients 
relatives and review of past case records

(5) Continued evaluation and monitoring will be ongoing during the course of 
patients inpatient hospitalization to inform treatment, to determine if 
adjustments in medication regimen may benefit patients symptoms, and for 
discharge planning

(6) Safety plan and follow-up outpatient appointments to be established prior 
to discharge  

(7) Confer with inpatient treatment team regarding initial treatment plan

(8) Review informed consent and recommendations for psychotropic medication 
treatment listed below now, during the course of hospitalization, and during 
discharge interview



ESTIMATED LENGTH OF STAY: 3-5 days



PSYCHOTROPIC MEDICATION TREATMENT INFORMED CONSENT and RECOMMENDATIONS: Review 
nature of condition, diagnosis, and prognosis.  Review nature and purpose of 
psychotropic medication treatment.  Review type of psychotropic medications 
being ordered.  Review risk and benefits of psychotropic medication treatment.  
Review probable length of time will need to take medications.  Review risk and 
benefits of not undergoing psychotropic medication treatment.  Review 
alternative treatments to psychotropic medications.  Review psychotropic 
medications contraindications, drug-drug interactions, side effects, and 
importance of reporting any side effects to a psychiatric provider or nurse 
during inpatient hospitalization, and upon discharge to patients psychiatric 
outpatient provider, primary care provider, or other health care professional.  
Review importance of asking a nurse, psychiatric provider, or primary care 
provider any questions or problems concerning the psychotropic medications.  
Verifty patient understands the information that has been provided, and 
understands, accepts, and agrees to psychotropic medications.  



Review patients safety plan and importance of patient to communicate to staff 
while hospitalized if patient is ever a danger to self/others, or unable to 
care for self, and upon discharge, the importance for patient to contact 
[facility] [facility] or [facility], or go to the nearest emergency room, if 
patient is ever a danger to self/others, or unable to care for self.  Recommend 
that upon discharge patient establish medication management treatment with a 
psychiatric provider, establishes routine therapy appointments, and follow-up 
with primary care provider.  Verify patient understands and agrees to these 
recommendations.



Job #:  [id number]

MTDD

## 2018-07-23 NOTE — PDHOSCONS
History and Physical





- Chief Complaint


Acute paranoia 





- History of Present Illness


PCP: Dr. Lynch


Primary Mental Health: P





HPI: 32 yo M p/w acute paranoia characterized as active delusions that the FBI 

are investigating him and that he has needed to occasionally run from persons 

chasing him. He reports associated jitteriness which was somewhat, temporarily 

alleviated by ativan/klonopin received in the ED. He also notes pain located in 

the lateral aspect of his left ankle, exacerbated by placing full weight on it. 

He notes associated lateral malleoli swelling. He denies using pain Rx to 

control the discomfort. He does endorse perhaps poor intake of solids and 

liquids immediately prior to presentation, as he describes not feeling hungry 

when he is "exercising". Onset of his paranoia is reportedly several months ago

, and he reports that he is taking all of his medications as prescribed by Dr. Lynch.  





History Information





- Allergies/Home Medication List


Allergies/Adverse Reactions: 








No Known Allergies Allergy (Verified 07/21/18 21:07)


 





Home Medications: 








Lithium Carbonate [Lithium Carbonate Tab 300 mg (*)] 450 mg PO DAILY@0900 07/22/ 18 [Last Taken 07/21/18]


Lithium Carbonate [Lithium Carbonate Tab 300 mg (*)] 675 mg PO DAILY@2100 07/22/ 18 [Last Taken 07/20/18]


Methylphenidate HCl [Concerta] 54 mg PO DAILY 07/22/18 [Last Taken 07/21/18]


Propranolol HCl [Inderal 10mg (*)] 10 mg PO BID 07/22/18 [Last Taken 07/21/18]





I have personally reviewed and updated: family history, medical history, social 

history, surgical history





- Past Medical History


Additional medical history: Patient believes he was misdiagnosed as having 

bipolar disease and questions this diagnosis





- Surgical History


Reports: no pertinent surgical hx





- Family History


Additional family history: "ADHD" throughout his family





- Social History


Smoking Status: Current some day smoker


Alcohol Use: None


Drug Use: None


Additional social history: Reports significant emotional distress at his sister 

running away from home at age 15; reports he is currently residing in [place]





Review of Systems


Review of Systems: 





ROS: 10pt was reviewed & negative except for what was stated in HPI & below


Muscolosketal: Reports: joint pain (L ankle)


Neurological: Reports: other (delusions/paranoia)





Physical Exam


Physical Exam: 

















Temp Pulse Resp BP Pulse Ox


 


 36.6 C   43 L  14   106/59 L  97 


 


 07/23/18 06:00  07/23/18 06:00  07/23/18 06:00  07/23/18 06:00  07/23/18 06:00











Constitutional: no apparent distress, appears nourished, not in pain


Eyes: PERRL, anicteric sclera, EOMI


Ears, Nose, Mouth, Throat: moist mucous membranes, hearing normal, ears appear 

normal, no oral mucosal ulcers


Cardiovascular: regular rate and rhythym, no murmur, rub, or gallop, No edema


Respiratory: no respiratory distress, no rales or rhonchi, clear to auscultation


Gastrointestinal: normoactive bowel sounds, soft, non-tender abdomen, no 

palpable masses


Genitourinary: other (no CVA tenderness)


Skin: warm, other (no cut marks, no track marks, no abrasion over L ankle)


Musculoskeletal: other (L lateral malleoli effusion w/ mild pain on dorsi/

plantar flexion as well as inversion/eversion, but no tenderness to palpation)


Neurologic: AAOx3, sensation intact bilaterally, No weakness


Psychiatric: other (answers questions appropriately, tangential and 

perseverating on Dr. Lynch, FBI, but is redirectible, maintains eye contact)





Lab Data & Imaging Review





 07/21/18 21:03





 07/21/18 21:03














WBC  13.03 10^3/uL (3.80-9.50)  H  07/21/18  21:03    


 


RBC  4.68 10^6/uL (4.40-6.38)   07/21/18  21:03    


 


Hgb  14.8 g/dL (13.7-17.5)   07/21/18  21:03    


 


Hct  43.4 % (40.0-51.0)   07/21/18  21:03    


 


MCV  92.7 fL (81.5-99.8)   07/21/18  21:03    


 


MCH  31.6 pg (27.9-34.1)   07/21/18  21:03    


 


MCHC  34.1 g/dL (32.4-36.7)   07/21/18  21:03    


 


RDW  13.1 % (11.5-15.2)   07/21/18  21:03    


 


Plt Count  244 10^3/uL (150-400)   07/21/18  21:03    


 


MPV  12.0 fL (8.7-11.7)  H  07/21/18  21:03    


 


Neut % (Auto)  65.5 % (39.3-74.2)   07/21/18  21:03    


 


Lymph % (Auto)  24.9 % (15.0-45.0)   07/21/18  21:03    


 


Mono % (Auto)  6.8 % (4.5-13.0)   07/21/18  21:03    


 


Eos % (Auto)  1.6 % (0.6-7.6)   07/21/18  21:03    


 


Baso % (Auto)  0.7 % (0.3-1.7)   07/21/18  21:03    


 


Nucleat RBC Rel Count  0.0 % (0.0-0.2)   07/21/18  21:03    


 


Absolute Neuts (auto)  8.55 10^3/uL (1.70-6.50)  H  07/21/18  21:03    


 


Absolute Lymphs (auto)  3.24 10^3/uL (1.00-3.00)  H  07/21/18  21:03    


 


Absolute Monos (auto)  0.88 10^3/uL (0.30-0.80)  H  07/21/18  21:03    


 


Absolute Eos (auto)  0.21 10^3/uL (0.03-0.40)   07/21/18  21:03    


 


Absolute Basos (auto)  0.09 10^3/uL (0.02-0.10)   07/21/18  21:03    


 


Absolute Nucleated RBC  0.00 10^3/uL (0-0.01)   07/21/18  21:03    


 


Immature Gran %  0.5 % (0.0-1.1)   07/21/18  21:03    


 


Immature Gran #  0.06 10^3/uL (0.00-0.10)   07/21/18  21:03    


 


Sodium  138 mEq/L (135-145)   07/21/18  21:03    


 


Potassium  3.6 mEq/L (3.3-5.0)   07/21/18  21:03    


 


Chloride  106 mEq/L ()   07/21/18  21:03    


 


Carbon Dioxide  21 mEq/l (22-31)  L  07/21/18  21:03    


 


Anion Gap  11 mEq/L (8-16)   07/21/18  21:03    


 


BUN  20 mg/dL (7-23)   07/21/18  21:03    


 


Creatinine  1.4 mg/dL (0.7-1.3)  H  07/21/18  21:03    


 


Estimated GFR  59   07/21/18  21:03    


 


Glucose  102 mg/dL ()  H  07/21/18  21:03    


 


Hemoglobin A1c  5.0 % (4.0-6.0)   07/22/18  21:03    


 


Estim Average Glucose  97 mg/dL ()   07/22/18  21:03    


 


Calcium  9.6 mg/dL (8.5-10.4)   07/21/18  21:03    


 


Total Bilirubin  2.8 mg/dL (0.1-1.4)  H  07/22/18  21:03    


 


Conjugated Bilirubin  0.7 mg/dL (0.0-0.5)  H  07/22/18  21:03    


 


Unconjugated Bilirubin  2.1 mg/dL (0.0-1.1)  H  07/22/18  21:03    


 


AST  158 IU/L (17-59)  H  07/22/18  21:03    


 


ALT  50 IU/L (21-72)   07/22/18  21:03    


 


Alkaline Phosphatase  80 IU/L ()   07/22/18  21:03    


 


Total Protein  7.9 g/dL (6.3-8.2)   07/22/18  21:03    


 


Albumin  4.4 g/dL (3.5-5.0)   07/22/18  21:03    


 


Triglycerides  72 mg/dL ()   07/22/18  21:03    


 


Cholesterol  204 mg/dL (140-200)  H  07/22/18  21:03    


 


Cholesterol Risk Factr  0.5  (0.2-1.0)   07/22/18  21:03    


 


LDL Cholesterol, Calc  130 mg/dL ()  H  07/22/18  21:03    


 


LDL Risk Factor  0.8  (0.2-1.0)   07/22/18  21:03    


 


VLDL Cholesterol  14 mg/dL (8-25)   07/22/18  21:03    


 


Non-HDL Cholesterol  144 mg/dL ()  H  07/22/18  21:03    


 


HDL Cholesterol  60 mg/dL (40-65)   07/22/18  21:03    


 


LDL/HDL Ratio  2.16 RATIO (1.00-3.64)   07/22/18  21:03    


 


Cholesterol/HDL Ratio  3.40 RATIO (1.00-4.97)   07/22/18  21:03    


 


TSH  2.640 uIU/mL (0.465-4.680)   07/22/18  21:03    


 


Salicylates  < 1.0 mg/dL (2.0-20.0)  L  07/21/18  21:03    


 


Urine Opiates Screen  NEGATIVE  (NEGATIVE)   07/21/18  21:30    


 


Acetaminophen  < 10 mcg/mL (10-30)  L  07/21/18  21:03    


 


Urine Barbiturates  NEGATIVE  (NEGATIVE)   07/21/18  21:30    


 


Ur Phencyclidine Scrn  NEGATIVE  (NEGATIVE)   07/21/18  21:30    


 


Ur Amphetamine Screen  NEGATIVE  (NEGATIVE)   07/21/18  21:30    


 


U Benzodiazepines Scrn  NEGATIVE  (NEGATIVE)   07/21/18  21:30    


 


Lithium  0.5 mEq/L (0.6-1.2)  L  07/22/18  07:30    


 


Urine Cocaine Screen  NEGATIVE  (NEGATIVE)   07/21/18  21:30    


 


U Marijuana (THC) Screen  NEGATIVE  (NEGATIVE)   07/21/18  21:30    


 


Ethyl Alcohol  < 10 mg/dL (0-10)   07/21/18  21:03    








Visualized and Interpreted imaging results: Yes


Interpretation: ankle x-ray w/o fracture





Assessment & Plan


Assessment: 








32 yo M p/w acute psychosis c/b RAFAEL, likely ligamentous injury to L malleoli





Plan: 





# Psychosis. Acute, delusional paranoia, reportedly diagnosis if bipolar disease


-reviewed outside records, ED report by Dr. Laughlin McCollester from 5/17/18 

indicates the patient presented w/ homicidal ideation and was placed on M1, 

transferred to [facility], rec obtaining records from 

that facility


-defer work-up and tx to primary psych team





# RAFAEL. Non-oliguric, likely 2/2 renal hypoperfusion in setting of hypovolemia 

from exercise, poor PO intake


-patient reports improved appetite since arrival, recommend good PO intake 

solids and liquids


-repeat Cr/BUN/lytes in AM


-avoid NSAIDS until Cr normalized





# Suspected ligamentous injury L malleoli. Patient likely "rolled" his ankle 

and strained lateral ligaments, no fxr on x-ray


-d/w RN, recommend ICE for pain, support orthotic brace 





# Transaminitis. Acute,n ew problem, further w/u indicated. Unclear etiology, 

potentially hyperbilirubinemia 2/2 acute stress


-repeat CMP in AM


-hold tylenol until AST normalized





Hospital medicine will review labs in AM and make any further recommendations 

required.

## 2018-07-23 NOTE — ASMTCMCOM
CM Note

 

CM Note                       

Notes:

CC speaks to client during check-in.  Client presents semi-alert, semi-focused, appropriate affect 

for environment; while displaying a mostly pleasant demeanor.  Client suggests feelings of anxiety 

(rated 3/10); while denying any feelings of depression, S/I-H/I or AVH.  Pt states, his mood is 

"stable and good," while reporting sleeping 10 hours.  CC sent referral for follow up appts to [facility] 

where he is a current open client.**

 

Date Signed:  07/23/2018 11:31 AM

Electronically Signed By:Denzel Grewal

## 2018-07-23 NOTE — ASMTBHDC
Notes

 

Note:                         

Notes:

CC was able to confirm client's follow up appts.  see below:







Follow up with:



[facility]

[address] [place] [address]

Phone: [phone] 



Therapist Appt: with Kathy on Thursday August 2nd (08/02/18) at 2pm**



Med Appt:  with Dr. Domínguez on Friday July 27th (07/27/18) at 1:30pm*** AT the [address] [place] [address]****

 

Date Signed:  07/23/2018 02:47 PM

Electronically Signed By:Denzel Grewal

## 2018-07-24 LAB — PLATELET # BLD: 185 10^3/UL (ref 150–400)

## 2018-07-24 RX ADMIN — PROPRANOLOL HYDROCHLORIDE SCH MG: 10 TABLET ORAL at 21:02

## 2018-07-24 RX ADMIN — LITHIUM CARBONATE SCH MG: 450 TABLET, EXTENDED RELEASE ORAL at 09:17

## 2018-07-24 RX ADMIN — PROPRANOLOL HYDROCHLORIDE SCH MG: 10 TABLET ORAL at 09:17

## 2018-07-24 RX ADMIN — LITHIUM CARBONATE SCH MG: 450 TABLET, EXTENDED RELEASE ORAL at 21:02

## 2018-07-24 NOTE — ASMTBHDC
Notes

 

Note:                         

Notes:

CC spoke to [facility] at [phone] to see if she can attend a family meeting tomorrow with her son 

and proivder.  She noted that she would be able to come to the family meeting at 1pm.  [facility] asked 

writer to call back and leave a VM with address, etc. Called back and VM is full.  CC will try 

again.**

 

Date Signed:  07/24/2018 03:58 PM

Electronically Signed By:Denzel Grewal

## 2018-07-24 NOTE — SOAPPROG
SOAP Progress Note


Assessment/Plan: 


Assessment:





Bipolar II Disorder.  R/O Personality Disorder.  Patient is improving (see 

subjective/objective note).  Patient is not safe to discharge at this time as 

patient could benefit from continued observation for safety and stabilization, 

and to discharge directly to OP therapist appointment on Thursday.  





Plan:





Review psychotropic medication treatment informed consent and recommendations.  

After reviewing options, risk and benefits, patient agrees to continue current 

medications.  Patient requests to make medication changes, if indicated, with 

his OP psychiatrist.  No medication changes at this time as more time is needed 

to determine ongoing tolerability and efficacy.  Plan is to continue to observe 

patient for response and side effects from medications, and ongoing monitoring 

and evaluation.  Next steps are for patient to meet with care manager to plan a 

safe discharge plan and establish outpatient services for ongoing treatment.  

Lithium level prior to discharge Thursday AM.  Consider discharge on Thursday 

if patient is in stable condition, safe, and has a safe discharge plan.   








PSYCHOTROPIC MEDICATION TREATMENT INFORMED CONSENT and RECOMMENDATIONS: Review 

nature of condition, diagnosis, and prognosis.  Review nature and purpose of 

psychotropic medication treatment.  Review type of psychotropic medications 

being ordered.  Review risk and benefits of psychotropic medication treatment.  

Review probable length of time patient will need to take medications.  Review 

risk and benefits of not undergoing psychotropic medication treatment.  Review 

alternative treatments to psychotropic medications.  Review psychotropic 

medications contraindications, drug-drug interactions, side effects, and 

importance of reporting any side effects to a psychiatric provider or nurse 

during inpatient hospitalization, and upon discharge to patients psychiatric 

outpatient provider, primary care provider, or other health care professional.  

Review importance of asking a nurse, psychiatric provider, or primary care 

provider any questions or problems concerning the psychotropic medications.  

Verify patient understands the information that has been provided, and 

understands, accepts, and agrees to psychotropic medications.  





Review patients safety plan and importance of patient to report to staff while 

hospitalized if patient is ever a danger to self/others, or unable to care for 

self, and upon discharge, the importance for patient to contact [facility] or [facility], or go to the nearest emergency room, if patient is ever a 

danger to self/others, or unable to care for self.  Recommend that upon 

discharge patient establish medication management treatment with a psychiatric 

provider, establishes routine therapy appointments, and follow-up with primary 

care provider.  Verify patient understands and agrees to these recommendations.








07/24/18 09:17





Subjective: 


Following up with patient for evaluation of depression and safety.  Patient 

reports, "Have you contacted my therapist and my mom to ask them about my 

history?  I have learned my lesson that using statements such as mass shootings 

is taken very seriously, and I regret making this statement in the letter I 

wrote to [place] [employer], I was just upset with how I was treated by my doctor.

"  Patient expresses the following psychiatric symptoms: none.  Patient states, 

"I think I am fine, I just need to work with my therapist on ways to de-

escalate when I get triggered."  Patient does not report undesirable side 

effects from the medications.  Patient reports appetite as normal, and reports 

eating all meals.  Patient describes getting 8 hours of sleep.  Patient denies 

SI/HI.  Patient agrees to continue current medications.  Patient requests to 

make medication changes, if indicated, with his OP psychiatrist.  Patient 

agrees to lithium level Thursday AM.  Patient agrees to voluntary 

hospitalization with plan to discharge on Thursday directly to his OP therapist 

appointment. 





Objective: 





 Vital Signs











Temp Pulse Resp BP Pulse Ox


 


 36.4 C   56 L  16   129/78 H  98 


 


 07/24/18 06:00  07/24/18 06:00  07/24/18 06:00  07/24/18 06:00  07/24/18 06:00








 Laboratory Results





 07/24/18 06:30 





 07/24/18 06:30 





NURSING REPORT: Consulted with nursing for update on patients progress in 

treatment.  Nurses report patient is engaged in treatment, is attending groups, 

slept 8 hours, expresses the following psychiatric symptoms: none, exhibits the 

following psychiatric symptoms: none, is eating all meals, is taking 

medications as prescribed with no report of side effects, with no s/s of EPS/

akathisia, and denies SI/HI, A/V hallucinations.





CASE COORDINATOR UPDATE: plan to contact rashi Lester this 

Thursday at [facility].  Dr. Sang MD at [facility] appointment this Friday.





The patient presents casually dressed and with good hygiene, and looks stated 

age.  Patient is sitting, posture is upright, and position is relaxed.  Patient 

appears awake, alert, and responds appropriately and reasonably during 

interview.  Patient is engaged, relates well to interviewer, and emotional 

facial expression is appropriate to situation and changes appropriately with 

topic.  Patient is cooperative, makes comfortable eye contact, and movements 

are voluntary, deliberate, coordinated, and smooth and even with no 

inappropriate movements.  Patient makes laryngeal sounds effortlessly and 

shares conversation appropriately; pace of conversation is appropriate, and 

stream of talking is fluent; articulation is clear and understandable; word 

choice is effortless and appropriate for education level; completes sentences, 

occasionally pausing to think; rate and volume are appropriate for interview 

and setting.  Patient reports mood as euthymic.  Patients affect is stable with 

full variable range, congruent with mood, and appropriate to speech and 

circumstances.  Patient has linear and logical thinking, with no loose 

associations, tangential thought, thought blocking, concrete thinking, or any 

other signs of formal thought disorder.  Patient denies suicidal and homicidal 

ideation, and denies hallucinations and delusions.  Patient appears to be a 

reliable historian with sound judgement and good insight into current 

condition.  Patient has no apparent dysfunction in recent or remote memory noted

, and no evidence of gross cognitive dysfunction noted at any point during the 

interview.











- Time Spent With Patient


Time Spent With Patient: 


15 minutes, met with patient individually.








- Pending Discharge


Pending Discharge Within 24 Hours: No


Pending Discharge Within 48 Hours: Yes


Pending Discharge Date: 07/26/18


Pending Discharge Time: 11:00





ICD10 Worksheet


Patient Problems: 


 Problems











Problem Status Onset


 


Paranoid personality disorder Acute  


 


Personality disorder, unspecified Acute  


 


Bipolar II disorder Acute  


 


Acute psychosis Acute

## 2018-07-25 RX ADMIN — PROPRANOLOL HYDROCHLORIDE SCH MG: 10 TABLET ORAL at 09:13

## 2018-07-25 RX ADMIN — LITHIUM CARBONATE SCH MG: 450 TABLET, EXTENDED RELEASE ORAL at 18:00

## 2018-07-25 RX ADMIN — LITHIUM CARBONATE SCH MG: 450 TABLET, EXTENDED RELEASE ORAL at 09:13

## 2018-07-25 RX ADMIN — PROPRANOLOL HYDROCHLORIDE SCH MG: 10 TABLET ORAL at 21:15

## 2018-07-25 NOTE — ASMTBHDC
Notes

 

Note:                         

Notes:

CC participates in family meeting with client, provider and MOC.  MOC notes, "that the story with 

the FBI and the police are true things."  Also, agreed that client appeared better then previous 

days, etc. Affect is appropriate to situation etc.  Below is revised discharge plan.  Client will 

discharge tomorrow to therapy appt at [facility] at 3pm**











Follow up with:







[facility]



[address] [place] [id number]



Phone: [phone] 







Therapist Appt: with Kathy on Thursday July 26th (07/26/18) at 3pm**







Med Appt:  with Dr. Domínguez on Friday July 27th (07/27/18) at 1:30pm*** AT the [address] [place] [address] 


2nd floor****











**Client to be walked over to appt.**

 

Date Signed:  07/25/2018 01:31 PM

Electronically Signed By:Denzel Grewal

## 2018-07-25 NOTE — ASMTBHDC
Notes

 

Note:                         

Notes:

CC tried again to contact [facility] at ([phone] to see if she could attend a family meeting today 

for client, provider, etc.  No answer, once again VM is full.**

 

Date Signed:  07/25/2018 08:14 AM

Electronically Signed By:Denzel Grweal

## 2018-07-25 NOTE — SOAPPROG
SOAP Progress Note


Assessment/Plan: 


Assessment:





Bipolar II Disorder.  R/O Cluster A Personality Disorder.  Patient is improving 

(see subjective/objective note).  Patient is not safe to discharge at this time 

as patient could benefit from continued observation for safety and stabilization

, and to discharge directly to OP therapist appointment on Thursday.  





Plan:





Review psychotropic medication treatment informed consent and recommendations.  

After reviewing options, risk and benefits, patient agrees to continue current 

medications.  Patient requests to make medication changes, if indicated, with 

his OP psychiatrist.  No medication changes at this time as more time is needed 

to determine ongoing tolerability and efficacy.  Plan is to continue to observe 

patient for response and side effects from medications, and ongoing monitoring 

and evaluation.  Next steps are for patient to meet with care manager to plan a 

safe discharge plan and establish outpatient services for ongoing treatment.  

Consider discharge on Thursday if patient is in stable condition, safe, and has 

a safe discharge plan.   








PSYCHOTROPIC MEDICATION TREATMENT INFORMED CONSENT and RECOMMENDATIONS: Review 

nature of condition, diagnosis, and prognosis.  Review nature and purpose of 

psychotropic medication treatment.  Review type of psychotropic medications 

being ordered.  Review risk and benefits of psychotropic medication treatment.  

Review probable length of time patient will need to take medications.  Review 

risk and benefits of not undergoing psychotropic medication treatment.  Review 

alternative treatments to psychotropic medications.  Review psychotropic 

medications contraindications, drug-drug interactions, side effects, and 

importance of reporting any side effects to a psychiatric provider or nurse 

during inpatient hospitalization, and upon discharge to patients psychiatric 

outpatient provider, primary care provider, or other health care professional.  

Review importance of asking a nurse, psychiatric provider, or primary care 

provider any questions or problems concerning the psychotropic medications.  

Verify patient understands the information that has been provided, and 

understands, accepts, and agrees to psychotropic medications.  





Review patients safety plan and importance of patient to report to staff while 

hospitalized if patient is ever a danger to self/others, or unable to care for 

self, and upon discharge, the importance for patient to contact [facility] or [facility], or go to the nearest emergency room, if patient is ever a 

danger to self/others, or unable to care for self.  Recommend that upon 

discharge patient establish medication management treatment with a psychiatric 

provider, establishes routine therapy appointments, and follow-up with primary 

care provider.  Verify patient understands and agrees to these recommendations.





07/25/18 08:30





Subjective: 


Following up with patient for evaluation of depression and safety.  Patient 

reports, "Enjoying groups, finding other ways to relax and gain coping skills.  

Need to consider my audience when expressing myself.  I did not consider my 

audience when I wrote the letter to the [place] [employer] and regret writing 

what I wrote.  I would never harm anyone and have never had any thoughts about 

harming anyone ever.  I really regret making that statement in the letter.  I 

regret what I said to the  prior to my admission here too.  Need 

to work on this with my therapist.  Better expressing myself in the appropriate 

way."  Patient expresses the following psychiatric symptoms: none.  Patient 

states, "Feeling okay."  Patient does not report undesirable side effects from 

the medications.  Patient reports appetite as normal, and reports eating all 

meals.  Patient describes getting 8 hours of sleep.  Patient denies SI/HI.  

Patient agrees to continue current medications.  Patient requests to make 

medication changes, if indicated, with his OP psychiatrist.  Patient agrees to 

voluntary hospitalization with plan to discharge on Thursday directly to his OP 

therapist appointment.  Patient agrees to family meeting with his mother today 

at 1PM. 





Objective: 





 Vital Signs











Temp Pulse Resp BP Pulse Ox


 


 36.4 C   50 L  15   101/51 L  96 


 


 07/25/18 06:00  07/25/18 06:00  07/25/18 06:00  07/25/18 06:00  07/25/18 06:00








 Laboratory Results





 07/24/18 06:30 





 07/24/18 06:30 





NURSING REPORT: Consulted with nursing for update on patients progress in 

treatment.  Nurses report patient is engaged in treatment, is attending groups, 

slept 8 hours, expresses the following psychiatric symptoms: none, exhibits the 

following psychiatric symptoms: none, is eating all meals, is taking 

medications as prescribed with no report of side effects, with no s/s of EPS/

akathisia, and denies SI/HI, A/V hallucinations, and denies delusions.





CASE COORDINATOR UPDATE: Allie Soria, therapist, this Thursday at [facility].  Dr. Sang MD at [facility], appointment this Friday.  Family meeting today with 

patients mother.  





The patient presents casually dressed and with good hygiene, and looks stated 

age.  Patient is sitting, posture is upright, and position is relaxed.  Patient 

appears awake, alert, and responds appropriately and reasonably during 

interview.  Patient is engaged, relates well to interviewer, and emotional 

facial expression is appropriate to situation and changes appropriately with 

topic.  Patient is cooperative, makes comfortable eye contact, and movements 

are voluntary, deliberate, coordinated, and smooth and even with no 

inappropriate movements.  Patient makes laryngeal sounds effortlessly and 

shares conversation appropriately; pace of conversation is appropriate, and 

stream of talking is fluent; articulation is clear and understandable; word 

choice is effortless and appropriate for education level; completes sentences, 

occasionally pausing to think; rate and volume are appropriate for interview 

and setting.  Patient reports mood as euthymic.  Patients affect is stable with 

full variable range, congruent with mood, and appropriate to speech and 

circumstances.  Patient has linear and logical thinking, with no loose 

associations, tangential thought, thought blocking, concrete thinking, or any 

other signs of formal thought disorder.  Patient denies suicidal and homicidal 

ideation, and denies hallucinations and delusions.  Patient appears to be a 

reliable historian with sound judgement and good insight into current 

condition.  Patient has no apparent dysfunction in recent or remote memory noted

, and no evidence of gross cognitive dysfunction noted at any point during the 

interview.











- Time Spent With Patient


Time Spent With Patient: 


20 minutes, met with patient individually.  Plan to meet with patient's mother 

per patient's request for family meeting today.








- Pending Discharge


Pending Discharge Within 24 Hours: Yes


Pending Discharge Within 48 Hours: No


Pending Discharge Date: 07/26/18


Pending Discharge Time: 11:00





ICD10 Worksheet


Patient Problems: 


 Problems











Problem Status Onset


 


Acute psychosis Acute  


 


Bipolar II disorder Acute  


 


Paranoid personality disorder Acute  


 


Personality disorder, unspecified Acute

## 2018-07-26 VITALS — SYSTOLIC BLOOD PRESSURE: 114 MMHG | DIASTOLIC BLOOD PRESSURE: 74 MMHG

## 2018-07-26 RX ADMIN — LITHIUM CARBONATE SCH MG: 450 TABLET, EXTENDED RELEASE ORAL at 08:08

## 2018-07-26 RX ADMIN — PROPRANOLOL HYDROCHLORIDE SCH MG: 10 TABLET ORAL at 08:07

## 2018-07-26 NOTE — BDS
[f 
rep st]



                                                  BEHAVIORAL HEALTH DISCHARGE 
SUMMARY





REASON FOR ADMISSION:  From the ED note dated 07/21/2018, the patient arrived 
to the ED by the [employer] on an M1 hold with a history of 
bipolar disorder, complaining of left ankle pain after he injured it while 
running from people he thought were following him.  The patient denied suicidal 
ideation and denied homicidal ideation in the emergency department.  The 
patient was admitted involuntarily on an M1 hold due to being gravely disabled 
due to a mental illness.  The patient was admitted for safety crisis, 
stabilization and medication management.



ADMITTING DIAGNOSIS: Bipolar II disorder.



PHYSICAL EXAM:  The patient was seen on 07/23/2018, for a hospitalist H and P 
consult.  The patient was medically cleared for inpatient psychiatric 
hospitalization and psychiatric medications and procedures.  For further 
information regarding the admission physical exam please refer to hospitalist H 
and P consult dated 07/23/2018.



ADMISSION LABS:  CBC from 07/24/2018, within normal limits except for an MPV 
elevated at 12.5.  Chemistry from 07/24/2018, within normal limits except for 
anion gap low at 7.  Hemoglobin A1c within normal limits at 5.0 from 07/22/
2018.  Liver function from 07/22/2018, total bilirubin elevated at 2.8, 
conjugated bilirubin elevated at 0.7, unconjugated bilirubin elevated at 2.1, 
AST elevated at 158.  AST from 07/24/2018, was elevated at 83.  Fasting lipid 
panel from 07/22/2018, cholesterol elevated at 204, LDL cholesterol calculated 
elevated at 130, non-HDL cholesterol elevated at 144.  Toxicology screen from 07
/21/2018, negative for substances of abuse.  Negative for ethyl alcohol.  
Lithium from 07/22/2018, 0.5.



MAJOR PROCEDURES OR TESTS: None.



HOSPITAL COURSE:  The most prominent symptoms and behaviors while the patient 
was here were mild anxiety.  Treatment modalities utilized were milieu and 
group therapy.  The patient's outpatient medications were continued, were 
tolerated with no report of side effects and with good response.  The patient 
has improved considerably with no signs of psychiatric symptoms and no 
psychiatric symptoms expressed at discharge.  The patient reports he has 
improved since admission.  States to be in stable condition.  Feels safe to 
discharge and he contracts for safety.  The patient's response to treatment was 
good.  There were no adverse or unexpected results of treatment.  The patient 
was safe throughout his stay, active in treatment, attended and engaged in 
groups, and was appropriate with staff and other patients.  The treatment team 
consensus is the patient is in stable condition and is safe to discharge today.



CONDITION AT DISCHARGE: Patient is in stable condition and is no longer a 
danger to self or others, and is not gravely disabled due to mental illness.  
Patient is no longer in need of inpatient level of care, and can be safely and 
effectively treated within the community.  The patients level of risk at time 
of discharge is low based on the risk assessment below following this discharge 
summary.  MSE: The patient is casually dressed and with good hygiene, and looks 
stated age.  Patient is sitting, posture is upright, and position is relaxed.  
Patient appears awake, alert, and responds appropriately and reasonably during 
interview.  Patient is engaged, relates well to interviewer, and emotional 
facial expression is appropriate to situation and changes appropriately with 
topic.  Patient is cooperative, makes comfortable eye contact, and movements 
are voluntary, deliberate, coordinated, and smooth and even with no 
inappropriate movements.  Patient makes laryngeal sounds effortlessly and 
shares conversation appropriately; pace of conversation is appropriate, and 
stream of talking is fluent; articulation is clear and understandable; word 
choice is effortless and appropriate for education level; completes sentences, 
occasionally pausing to think; rate and volume are appropriate for interview 
and setting.  Patient reports mood as euthymic.  Patients affect is stable with 
full variable range, congruent with mood, and appropriate to speech and 
circumstances.  Patient has linear and logical thinking, with no loose 
associations, tangential thought, thought blocking, concrete thinking, or any 
other signs of formal thought disorder.  Patient denies suicidal and homicidal 
ideation, and denies hallucinations and delusions.  Patient appears to be a 
reliable historian with sound judgement and good insight into current 
condition.  Patient has no apparent dysfunction in recent or remote memory noted
, and no evidence of gross cognitive dysfunction noted at any point during the 
interview. 



DISCHARGE DIAGNOSIS: Principal diagnosis: bipolar II disorder.



CURRENT MEDICATIONS:  Seroquel 200 mg p.o. at bedtime, Concerta 54 mg p.o. daily
, lithium carbonate 450 mg p.o. daily at 9 a.m., lithium carbonate 675 mg p.o. 
daily at 9 p.m., propranolol 10 mg p.o. twice daily.



DISPOSITION:  The patient left hospital independently and voluntarily with our 
care coordinator, who will walk the patient to his outpatient appointment at 
[facility].  The patient has an appointment for therapy today, and an 
appointment with his OP psychiatrist tomorrow. 



FOLLOWUP: Care coordinator reports the appropriate outpatient follow-up 
services have been established and outpatient appointments have been scheduled.
  The patient received written instructions with times and dates of outpatient 
follow-up appointments.  The following follow-up recommendations were provided 
to the patient at discharge: Continue psychotropic medications as prescribed 
and attend appointments as scheduled.  Report any side effects to a psychiatric 
outpatient provider, a primary care provider, or other health care 
professional.  Address any questions or problems concerning the psychotropic 
medications with a psychiatric outpatient provider, a primary care provider, or 
other health care professional.  Contact [facility] or [facility], or go 
to the nearest emergency room, if you are ever a danger to yourself/others, or 
unable to care for yourself.  As soon as possible, establish a routine 
medication management treatment with a psychiatric provider, establish routine 
therapy appointments, and follow-up with a primary care provider.  



LEGAL COURSE:  The patient was admitted on an M1 hold for involuntary 
psychiatric hospitalization.  The patient became voluntary during 
hospitalization.  The patient discharged today independently and voluntarily.



ATTITUDE AT TIME OF DISCHARGE:  The patient's attitude was positive at time of 
discharge and patient reports looking forward to discharging today.  The 
patient reports he feels safe to discharge, is not a danger to himself or others
, is in stable condition and contracts for safety.  The patient states he will 
continue medications as prescribed and establish medication management 
treatment with an outpatient provider after discharge.  The patient reports he 
understands the information that has been provided to him and he understands, 
accepts and agrees to psychotropic medications.  The patient describes internal 
protective factors as the coping skills he has learned while hospitalized here 
and he plans to continue to practice these coping skills after discharge.  The 
patient reports external protective factors as a future plan to go to law school
, his family and his friends.  The patient describes looking forward to going 
to his therapy appointment after discharge today.  The patient describes future 
plans as getting a law degree and combining his law school education with his 
engineering education.  The patient reports he feels confident he will be safe 
to discharge today.  The patient reports feeling confident about being 
successfully discharged as he has a good discharge plan in place with 
outpatient services.  The patient reports he has completed his wellness plan 
and reviewed wellness plan with his nurse.  The patient states his family and 
friends look forward to him discharging today.  The patient's mother met with 
this NP, the patient, and the care coordinator yesterday and mother reports 
patient is safe to discharge and has a safe discharge plan in place.



TREATMENT TEAM DISCHARGE PLANNING:  The patient met with the entire treatment 
team today to discuss his treatment and his discharge plan.  The patient 
responded well to the meeting and reports that he has everything in place for 
discharge including outpatient services for therapy and medication management.



LABS AND STUDIES: There were no pending labs or studies at time of discharge.



ADVANCED DIRECTIVES: There were no advance directives on file, and patient was 
full code during hospitalization.



The following psychotropic medication treatment informed consent and 
recommendations were provided to the patient at time of discharge.  Patient 
reports he understands, accepts, and agrees to the information that has been 
provided.   



PSYCHOTROPIC MEDICATION TREATMENT INFORMED CONSENT and RECOMMENDATIONS: Review 
nature of condition, diagnosis, and prognosis.  Review nature and purpose of 
psychotropic medication treatment. Review type of psychotropic medications 
being prescribed.  Review risk and benefits of psychotropic medication 
treatment.  Review probable length of time will need to take medications.  
Review risk and benefits of not undergoing psychotropic medication treatment.  
Review alternative treatments to psychotropic medications.  Review psychotropic 
medications contraindications, side effects, and importance of reporting any 
side effects to a psychiatric provider, primary care provider, or other health 
care professional.  Review importance of her asking a psychiatric provider or 
primary care provider any questions or problems concerning the psychotropic 
medications.  



Review safety plan and the importance to contact [facility] or [facility]
, or go to the nearest emergency room, if ever a danger to yourself/others, or 
unable to care for yourself.  Recommend upon discharge to establish routine 
medication management treatment with a psychiatric provider, establish routine 
therapy appointments, and follow-up with a primary care provider.  Verify 
patient understands, accepts, and agrees to the information that has been 
provided. 



SUICIDE/HOMICIDE ASSESSMENT FIVE-STEP EVALUATION AND TRIAGE



(1) RISK FACTORS: 



(a) Suicidal/homicidal behavior: no history of homicidal/suicidal thoughts, 
ideas, or plans.

(b) Current/past psychiatric disorders: Bipolar II Disorder, currently well-
controlled

(c) Key symptoms: patient reports no psychiatric symptoms at time of discharge

(d) Family history: no family history of homicidal/suicidal thoughts, ideas, or 
plans.

(e) Precipitants/Stressors/Interpersonal: patient reports no current stressors

(f) Change in treatment: discharge from psychiatric hospital 

(g) Access to firearms: none



(2) PROTECTIVE FACTORS: 

(a) Internal: coping skills learned while hospitalized; patient motivated to 
continue building coping skills with his OP therapist

(b) External: family and future plans



(3) SUICIDAL/HOMICIDAL INQUIRY: 

(a) Ideation: none 

(b) Plan: none 

(c) Behaviors: none; patient was safe throughout stay; appropriate with staff 
and other patients

(d) Intent: none



(4) RISK LEVEL: Low: modifiable risk factors, strong protective factors; no 
suicidal/homicidal thoughts, ideas, or plans.  Intervention: treatment plan to 
reduce symptoms including medications and therapy, provided emergency/crisis 
numbers, and established follow-up plan.  Patient has supportive family, and 
has follow-up appointments with his therapist today, and with his OP 
psychiatrist tomorrow (Friday).  Patient motivated for ongoing treatment with 
OP therapist and psychiatrist.   



Job #:  [id number]

MTDD

## 2018-07-26 NOTE — ASMTBHDC
Notes

 

Note:                         

Notes:

Treatment team meeting with client, charge nurse, doctor, provider, therapist and CC's.  Client 

presented as alert, insightful and goal oriented, while displaying a pleasant demeanor.  Client 

suggests that he is ready to start taking his mental health more seriously and is discharging today 


from [facility] to his [facility] appt at 3pm.* Client thanked treatment team for their help and support while 

dealing with his mental health issues. etc.   

 

Date Signed:  07/26/2018 01:12 PM

Electronically Signed By:Denzel Grewal

## 2018-07-26 NOTE — ASMTBHDC
Notes

 

Note:                         

Notes:

CC confirmed discharge appts, see below:





Follow up with:



[facility]

[address] [place] [id number]

Phone: [phone] 



Therapist Appt: with Kathy on Thursday July 26th (07/26/18) at 3pm**



Med Appt:  with Dr. Domínguez on Friday July 27th (07/27/18) at 1:30pm*** AT the [address] [place] [address] 


2nd floor****





**Client to be walked over to appt.**

 

Date Signed:  07/26/2018 08:05 AM

Electronically Signed By:Denzel Grewal

## 2021-06-25 NOTE — ASMTTCLDSP
[facility] Discharge Disposition

 

Disposition:                  Answers:  Admit                                 

Disposition Notes:            

Notes:

In consultation with ED MD Ana Cason and on call psychiatrist Dr. Travon King both concurred that 


pt meets the 27-65 criteria requiring in-pt psychiatric hospitalization as pt appears to be gravely 


disabled.

 

Date Signed:  07/22/2018 01:19 PM

Electronically Signed By:Agnieszka Mathew Thalidomide Counseling: I discussed with the patient the risks of thalidomide including but not limited to birth defects, anxiety, weakness, chest pain, dizziness, cough and severe allergy.